# Patient Record
Sex: MALE | Race: WHITE
[De-identification: names, ages, dates, MRNs, and addresses within clinical notes are randomized per-mention and may not be internally consistent; named-entity substitution may affect disease eponyms.]

---

## 2022-01-11 ENCOUNTER — HOSPITAL ENCOUNTER (EMERGENCY)
Dept: HOSPITAL 97 - ER | Age: 57
LOS: 1 days | Discharge: HOME | End: 2022-01-12
Payer: COMMERCIAL

## 2022-01-11 DIAGNOSIS — E11.9: ICD-10-CM

## 2022-01-11 DIAGNOSIS — Z20.822: ICD-10-CM

## 2022-01-11 DIAGNOSIS — J20.9: Primary | ICD-10-CM

## 2022-01-11 DIAGNOSIS — I10: ICD-10-CM

## 2022-01-11 LAB — SARS-COV-2 RNA RESP QL NAA+PROBE: NEGATIVE

## 2022-01-11 PROCEDURE — 99283 EMERGENCY DEPT VISIT LOW MDM: CPT

## 2022-01-11 PROCEDURE — 0240U: CPT

## 2022-01-11 PROCEDURE — 71046 X-RAY EXAM CHEST 2 VIEWS: CPT

## 2022-01-12 VITALS — SYSTOLIC BLOOD PRESSURE: 151 MMHG | TEMPERATURE: 98.2 F | DIASTOLIC BLOOD PRESSURE: 74 MMHG

## 2022-01-12 VITALS — OXYGEN SATURATION: 100 %

## 2022-01-12 NOTE — EDPHYS
Physician Documentation                                                                           

 [facility]                                                                 

Name: Og Arrieta                                                                                

Age: 56 yrs                                                                                       

Sex: Male                                                                                         

: 1965                                                                                   

MRN: [id number]                                                                                   

Arrival Date: 2022                                                                          

Time: 19:42                                                                                       

Account#: [id number]                                                                            

Bed 18                                                                                            

Private MD:                                                                                       

ED Physician Ken Day                                                                         

HPI:                                                                                              

                                                                                             

22:55 This 56 yrs old Male presents to ER via Ambulatory with complaints of Chest Congestion, cp  

      Cough.                                                                                      

22:55 The patient or guardian reports cough, that is intermittent.                            cp  

22:55 Onset: The symptoms/episode began/occurred 4 day(s) ago. Associated signs and symptoms: cp  

      Pertinent positives: rhinorrhea, chest congestion, Pertinent negatives: chest pain,         

      fever, sore throat, vomiting. Severity of symptoms: in the emergency department the         

      symptoms are unchanged despite home interventions.                                          

                                                                                                  

Historical:                                                                                       

- Allergies:                                                                                      

20:57 No Known Allergies;                                                                     vc1 

- PMHx:                                                                                           

20:57 Diabetes mellitus; Hypertensive disorder;                                               vc1 

                                                                                                  

- Immunization history:: Adult Immunizations up to date.                                          

- Social history:: Smoking status: Patient denies any tobacco usage or history of.                

                                                                                                  

                                                                                                  

ROS:                                                                                              

23:00 Constitutional: Negative for body aches, chills, fever, poor PO intake.                 cp  

23:00 Eyes: Negative for injury, pain, redness, and discharge.                                cp  

23:00 ENT: Negative for drainage from ear(s), ear pain, sore throat, difficulty swallowing,       

      difficulty handling secretions.                                                             

23:00 Neck: Negative for pain with movement, pain at rest, stiffness.                             

23:00 Cardiovascular: Negative for chest pain, edema, palpitations.                               

23:00 Respiratory: Positive for cough, "sounds productive", chest congestion, Negative for        

      shortness of breath, wheezing.                                                              

23:00 Abdomen/GI: Negative for abdominal pain, nausea, vomiting, and diarrhea.                    

23:00 Neuro: Negative for altered mental status, headache, weakness.                              

23:00 All other systems are negative.                                                             

                                                                                                  

Exam:                                                                                             

23:05 Constitutional: The patient appears in no acute distress, alert, awake,                 cp  

      non-diaphoretic, non-toxic, well developed, well nourished.                                 

23:05 Head/Face:  Normocephalic, atraumatic.                                                  cp  

23:05 Eyes: Periorbital structures: appear normal, Conjunctiva: normal, no exudate, no            

      injection, Lids and lashes: appear normal, bilaterally.                                     

23:05 ENT: External ear(s): are unremarkable, Ear canal(s): are normal, clear, TM's: bulging,     

      is not appreciated, bilaterally, dullness, bilaterally, erythema, is not appreciated,       

      bilaterally, Nose: is normal, Mouth: Lips: moist, Oral mucosa: pink and intact, moist,      

      Posterior pharynx: Airway: no evidence of obstruction, patent, Tonsils: no enlargement,     

      no erythema, no exudate, erythema, is not appreciated, exudate, is not appreciated.         

23:05 Neck: ROM/movement: is normal, is supple, without pain, no range of motions                 

      limitations, no meningismus, no nuchal rigidity, Lymph nodes: no appreciated                

      lymphadenopathy.                                                                            

23:05 Chest/axilla: Inspection: normal.                                                           

23:05 Cardiovascular: Rate: normal, Rhythm: regular, Edema: is not appreciated, JVD: is not       

      appreciated.                                                                                

23:05 Respiratory: the patient does not display signs of respiratory distress,  Respirations:     

      normal, no use of accessory muscles, no retractions, labored breathing, is not present,     

      Breath sounds: decreased breath sounds, are not appreciated, stridor, is not                

      appreciated, + upper airway congestion. wheezing: is not appreciated.                       

23:05 Abdomen/GI: Exam negative for discomfort, distension, guarding, Inspection: abdomen         

      appears normal.                                                                             

23:05 Back: pain, is absent, ROM is normal.                                                       

23:05 Neuro: Orientation: to person, place \T\ time. Mentation: is normal, Motor: moves all       

      fours, strength is normal, Sensation: is normal.                                            

                                                                                                  

Vital Signs:                                                                                      

20:54  / 88; Pulse 78; Resp 16; Temp 98; Weight 99.79 kg; Height 5 ft. 6 in. (167.64    vc1 

      cm); Pain 0/10;                                                                             

22:33  / 94; Pulse 82; Resp 17; Temp 97.5; Pulse Ox 100% on R/A;                        kd3 

                                                                                             

00:22  / 74; Pulse 82; Resp 17; Temp 98.2; Pulse Ox 100% ;                              kd3 

                                                                                             

20:54 Body Mass Index 35.51 (99.79 kg, 167.64 cm)                                             vc1 

                                                                                                  

MDM:                                                                                              

                                                                                             

22:19 Patient medically screened.                                                             cp  

23:00 Differential diagnosis: bronchitis, flu, pneumonia, COVID-19.                           cp  

23:37 Test interpretation: by ED physician or midlevel provider: chest xray negative for      cp  

      infiltrates.                                                                                

                                                                                             

00:10 Data reviewed: vital signs, nurses notes, lab test result(s), radiologic studies, plain cp  

      films.                                                                                      

00:10 Counseling: I had a detailed discussion with the patient and/or guardian regarding: the cp  

      historical points, exam findings, and any diagnostic results supporting the                 

      discharge/admit diagnosis, lab results, radiology results, to return to the emergency       

      department if symptoms worsen or persist or if there are any questions or concerns that     

      arise at home. ED course: VSS. Patient appears non-toxic and no signs of respiratory        

      distress. Will discharge to home for continued monitoring.                                  

                                                                                                  

                                                                                             

21:30 Order name: COVID-19/FLU A+B (Document "Date of Onset" if Symptomatic)                  bb  

                                                                                             

21:31 Order name: COVID-19/FLU A+B; Complete Time: 22:25                                      EDMS

                                                                                             

22:49 Order name: XRAY Chest Pa And Lat (2 Views)                                             cp  

                                                                                                  

Administered Medications:                                                                         

No medications were administered                                                                  

                                                                                                  

                                                                                                  

Disposition:                                                                                      

10:08 Co-signature as Attending Physician, Ken Day MD I agree with the assessment and     sp3 

      plan of care.                                                                               

                                                                                                  

Disposition Summary:                                                                              

22 00:10                                                                                    

Discharge Ordered                                                                                 

      Location: Home                                                                          cp  

      Problem: new                                                                            cp  

      Symptoms: are unchanged                                                                 cp  

      Condition: Stable                                                                       cp  

      Diagnosis                                                                                   

        - Acute bronchitis, unspecified                                                       cp  

      Followup:                                                                               cp  

        - With: Private Physician                                                                  

        - When: 2 - 3 days                                                                         

        - Reason: Worsening of condition                                                           

      Discharge Instructions:                                                                     

        - Discharge Summary Sheet                                                             cp  

        - Acute Bronchitis, Adult                                                             cp  

      Forms:                                                                                      

        - Medication Reconciliation Form                                                      cp  

        - Thank You Letter                                                                    cp  

        - Antibiotic Education                                                                cp  

        - Prescription Opioid Use                                                             cp  

      Prescriptions:                                                                              

        - albuterol sulfate 90 mcg/actuation Inhalation HFA aerosol inhaler                        

            - inhale 1 puff by INHALATION route every 4-6 hours; 1 Inhaler; Refills: 0,       cp  

      Product Selection Permitted                                                                 

        - Tessalon Perles 100 mg Oral Capsule                                                      

            - take 2 capsule by ORAL route every 8 hours As needed; 30 capsule; Refills: 0,   cp  

      Product Selection Permitted                                                                 

        - Zithromax Z-Rob 250 mg Oral Tablet                                                       

            - take 1 tablet by ORAL route as directed for 5 days Day 1 - take two (2) tablets cp  

      one time.  Day 2, 3, 4 , 5 take one (1) tablet once daily.; 6 tablet; Refills: 0,           

      Product Selection Permitted                                                                 

Signatures:                                                                                       

Dispatcher [employer]                           EDTucker Claudio PA PA cp Patel, Setul, MD MD   sp3                                                  

Mitzy Bermudez RN                    RN   vc1                                                  

                                                                                                  

**************************************************************************************************

## 2022-01-12 NOTE — ER
Nurse's Notes                                                                                     

 [facility]                                                                 

Name: Og Arrieta                                                                                

Age: 56 yrs                                                                                       

Sex: Male                                                                                         

: 1965                                                                                   

MRN: [id number]                                                                                   

Arrival Date: 2022                                                                          

Time: 19:42                                                                                       

Account#: [id number]                                                                            

Bed 18                                                                                            

Private MD:                                                                                       

Diagnosis: Acute bronchitis, unspecified                                                          

                                                                                                  

Presentation:                                                                                     

                                                                                             

20:54 Chief complaint: Patient states: I have chest congestion real bad, I've been coughing   vc1 

      and my nose has been running. Coronavirus screen: Vaccine status: Patient reports           

      receiving the 2nd dose of the covid vaccine. 1st 2 doses pfizer booster was moderna         

      congestion, cough unrelated to allergies, shortness of breath, Client presents with at      

      least one sign or symptom that may indicate coronavirus-19. Standard/surgical mask          

      placed on the client. Ebola Screen: No symptoms or risks identified at this time.           

      Initial Sepsis Screen: Does the patient meet any 2 criteria? Yes Does the patient have      

      a suspected source of infection? No. Patient's initial sepsis screen is negative. Risk      

      Assessment: Do you want to hurt yourself or someone else? Patient reports no desire to      

      harm self or others. Onset of symptoms was 2022.                                

20:54 Method Of Arrival: Ambulatory                                                           vc1 

20:54 Acuity: TYESHA 4                                                                           vc1 

                                                                                                  

Triage Assessment:                                                                                

20:57 General: Appears in no apparent distress. Behavior is calm, cooperative, appropriate    vc1 

      for age. Pain: Denies pain. EENT: Reports nasal congestion nasal discharge.                 

      Cardiovascular: No deficits noted. Respiratory: Reports shortness of breath Airway is       

      patent Respiratory effort is even, unlabored, Respiratory pattern is regular,               

      symmetrical.                                                                                

                                                                                                  

Historical:                                                                                       

- Allergies:                                                                                      

20:57 No Known Allergies;                                                                     vc1 

- PMHx:                                                                                           

20:57 Diabetes mellitus; Hypertensive disorder;                                               vc1 

                                                                                                  

- Immunization history:: Adult Immunizations up to date.                                          

- Social history:: Smoking status: Patient denies any tobacco usage or history of.                

                                                                                                  

                                                                                                  

Screenin:34 Abuse screen: Denies threats or abuse. Denies injuries from another. Nutritional        kd3 

      screening: No deficits noted. Tuberculosis screening: No symptoms or risk factors           

      identified. Fall Risk None identified.                                                      

                                                                                                  

Assessment:                                                                                       

22:32 Reassessment: Patient and/or family updated on plan of care and expected duration. Pain kd3 

      level reassessed. Patient is alert, oriented x 3, equal unlabored respirations, skin        

      warm/dry/pink. Respiratory: Airway is patent Respiratory effort is even, unlabored,         

      Breath sounds are clear bilaterally. GI: No deficits noted. Patient currently denies        

      diarrhea.                                                                                   

                                                                                                  

Vital Signs:                                                                                      

20:54  / 88; Pulse 78; Resp 16; Temp 98; Weight 99.79 kg; Height 5 ft. 6 in. (167.64    vc1 

      cm); Pain 0/10;                                                                             

22:33  / 94; Pulse 82; Resp 17; Temp 97.5; Pulse Ox 100% on R/A;                        kd3 

                                                                                             

00:22  / 74; Pulse 82; Resp 17; Temp 98.2; Pulse Ox 100% ;                              kd3 

                                                                                             

20:54 Body Mass Index 35.51 (99.79 kg, 167.64 cm)                                             vc1 

                                                                                                  

ED Course:                                                                                        

                                                                                             

19:42 Patient arrived in ED.                                                                  wm  

20:39 Tucker Glynn PA is PHCP.                                                                cp  

20:39 Ken Day MD is Attending Physician.                                                cp  

20:57 Triage completed.                                                                       vc1 

20:59 Arm band placed on left wrist.                                                          vc1 

22:28 Ana Torres, RN is Primary Nurse.                                                    kd3 

22:34 Patient has correct armband on for positive identification. Bed in low position. Call   kd3 

      light in reach. Side rails up X2.                                                           

23:22 COVID-19/FLU A+B (Document "Date of Onset" if Symptomatic) Sent.                        kd3 

23:26 XRAY Chest Pa And Lat (2 Views) In Process Unspecified.                                 EDMS

                                                                                             

00:21 No provider procedures requiring assistance completed. Patient did not have IV access   kd3 

      during this emergency room visit.                                                           

                                                                                                  

Administered Medications:                                                                         

No medications were administered                                                                  

                                                                                                  

                                                                                                  

Outcome:                                                                                          

00:10 Discharge ordered by MD.                                                                cp  

00:21 Discharged to home ambulatory.                                                          kd3 

00:21 Condition: stable                                                                           

00:21 Discharge instructions given to patient, Instructed on discharge instructions, follow       

      up and referral plans. medication usage, Demonstrated understanding of instructions,        

      follow-up care, medications, Prescriptions given X 3.                                       

00:22 Patient left the ED.                                                                    kd3 

                                                                                                  

Signatures:                                                                                       

Dispatcher [employer]                           EDMS                                                 

Tucker Glynn PA PA cp Marsh, Wendy                                                                                    

Ana Torres RN                      RN   kd3                                                  

Mitzy Bermudez RN                    RN   vc1                                                  

                                                                                                  

**************************************************************************************************

## 2022-01-12 NOTE — RAD REPORT
EXAM DESCRIPTION:  RAD - Chest Pa And Lat (2 Views) - 1/11/2022 11:27 pm

 

CLINICAL HISTORY:  COUGH

 

COMPARISON:  None

 

TECHNIQUE:  Frontal and lateral views of the chest were obtained.

 

FINDINGS:  The lungs are clear.   Heart size is normal and central vasculature is within normal limit
s.  No pleural effusion or pneumothorax seen.  No acute bony finding noted.  No aortic abnormality.

 

IMPRESSION:  No acute cardiopulmonary process.